# Patient Record
Sex: FEMALE | Race: WHITE
[De-identification: names, ages, dates, MRNs, and addresses within clinical notes are randomized per-mention and may not be internally consistent; named-entity substitution may affect disease eponyms.]

---

## 2019-12-08 ENCOUNTER — HOSPITAL ENCOUNTER (OUTPATIENT)
Dept: HOSPITAL 84 - D.LDO | Age: 29
Discharge: HOME | End: 2019-12-08
Attending: OBSTETRICS & GYNECOLOGY
Payer: MEDICAID

## 2019-12-08 VITALS — BODY MASS INDEX: 21.1 KG/M2 | BODY MASS INDEX: 27.5 KG/M2

## 2019-12-08 DIAGNOSIS — R10.84: ICD-10-CM

## 2019-12-08 DIAGNOSIS — Z3A.38: ICD-10-CM

## 2019-12-08 DIAGNOSIS — O26.893: Primary | ICD-10-CM

## 2019-12-10 ENCOUNTER — HOSPITAL ENCOUNTER (INPATIENT)
Dept: HOSPITAL 84 - D.LD | Age: 29
LOS: 3 days | Discharge: HOME | End: 2019-12-13
Attending: OBSTETRICS & GYNECOLOGY | Admitting: OBSTETRICS & GYNECOLOGY
Payer: MEDICAID

## 2019-12-10 VITALS — SYSTOLIC BLOOD PRESSURE: 138 MMHG | DIASTOLIC BLOOD PRESSURE: 68 MMHG

## 2019-12-10 VITALS — HEIGHT: 64 IN | WEIGHT: 160 LBS | BODY MASS INDEX: 27.31 KG/M2

## 2019-12-10 DIAGNOSIS — Z3A.38: ICD-10-CM

## 2019-12-11 VITALS — DIASTOLIC BLOOD PRESSURE: 65 MMHG | SYSTOLIC BLOOD PRESSURE: 120 MMHG

## 2019-12-11 LAB
APPEARANCE UR: CLEAR
BACTERIA #/AREA URNS HPF: (no result) /HPF
BILIRUB SERPL-MCNC: NEGATIVE MG/DL
CAOX CRY #/AREA URNS HPF: (no result) /HPF
COLOR UR: YELLOW
ERYTHROCYTE [DISTWIDTH] IN BLOOD BY AUTOMATED COUNT: 13.8 % (ref 11.5–14.5)
GLUCOSE SERPL-MCNC: NEGATIVE MG/DL
HCT VFR BLD CALC: 32 % (ref 36–48)
HGB BLD-MCNC: 10.6 G/DL (ref 12–16)
KETONES UR STRIP-MCNC: NEGATIVE MG/DL
MCH RBC QN AUTO: 31.3 PG (ref 26–34)
MCHC RBC AUTO-ENTMCNC: 33.1 G/DL (ref 31–37)
MCV RBC: 94.4 FL (ref 80–100)
MUCOUS THREADS #/AREA URNS LPF: (no result) /LPF
NITRITE UR-MCNC: NEGATIVE MG/ML
PH UR STRIP: 6 [PH] (ref 5–6)
PLATELET # BLD: 253 10X3/UL (ref 130–400)
PMV BLD AUTO: 10.7 FL (ref 7.4–10.4)
PROT UR-MCNC: (no result) MG/DL
RBC # BLD AUTO: 3.39 10X6/UL (ref 4–5.4)
RBC #/AREA URNS HPF: (no result) /HPF (ref 0–5)
SP GR UR STRIP: 1.02 (ref 1–1.02)
SQUAMOUS #/AREA URNS HPF: (no result) /HPF (ref 0–5)
UROBILINOGEN UR-MCNC: NORMAL MG/DL
WBC # BLD AUTO: 9.2 10X3/UL (ref 4.8–10.8)
WBC #/AREA URNS HPF: (no result) /HPF

## 2019-12-11 PROCEDURE — 0HQ9XZZ REPAIR PERINEUM SKIN, EXTERNAL APPROACH: ICD-10-PCS | Performed by: OBSTETRICS & GYNECOLOGY

## 2019-12-11 PROCEDURE — 3E0P7VZ INTRODUCTION OF HORMONE INTO FEMALE REPRODUCTIVE, VIA NATURAL OR ARTIFICIAL OPENING: ICD-10-PCS | Performed by: OBSTETRICS & GYNECOLOGY

## 2019-12-11 NOTE — NUR
PATIENT SITTING UP IN BED. ASSESSMENT AND VITAL SIGNS DONE. RESPIRATIONS AT
EASE. LUNG SOUNDS CLEAR IN ALL FIELDS. HEART REGULAR RATE AND RHYTHM. ABDOMEN
SOFT, NON TENDER. BOWEL SOUNDS PRESENT IN ALL QUADRANTS. FUNDUS FIRM, MIDLINE,
1 BELOW UMBILICUS. SMALL AMOUNT OF LOCHIA NOTED ON DARRON PAD. NO EDEMA NOTED TO
EXTREMITIES. PATIENT DENIES ANY PAIN AT THIS TIME. BED IN LOWEST POSITION,
SIDE RAILS UP X 2, C/L AND WATER WITHIN REACH.

## 2019-12-11 NOTE — NUR
PATIENT TRANSFERRED TO POST PARTUM ROOM 1273. PATIENT AMBULATED IN HALLWAY TO
ROOM WITH STEADY GAIT. BED IN LOWEST POSITION, SIDE RAILS UP X 2, C/L AND
WATER WITHIN REACH.

## 2019-12-11 NOTE — NUR
PATIENT LYING IN BED WITH EYES CLOSED. RESPIRATIONS AT EASE. NO SIGNS OF
DISTRESS NOTED. BED IN LOWEST POSITION, SIDE RAILS UP X 2, C/L AND WATER
WITHIN REACH.

## 2019-12-11 NOTE — NUR
PATIENT SITTING UP IN BED. PATIENT STATES SHE AMBULATED TO BATHROOM AND VOIDED
LARGE AMOUNT. DENIES ANY NEEDS AT THIS TIME. BED IN LOWEST POSITION, SIDE
RAILS UP X 2, C/L AND WATER WITHIN REACH.

## 2019-12-11 NOTE — NUR
PATIENT SITTING UP IN BED.  AT BEDSIDE. PATIENT DENIES ANY PAIN.
SCHEDULED TYLENOL 1000MG ADMINISTERED PO. PATIENT DENIES ANY NEEDS. BED IN
LOWEST POSITION, SIDE RAILS UP X 2, C/L AND WATER WITHIN REACH.

## 2019-12-12 VITALS — SYSTOLIC BLOOD PRESSURE: 110 MMHG | DIASTOLIC BLOOD PRESSURE: 65 MMHG

## 2019-12-12 VITALS — DIASTOLIC BLOOD PRESSURE: 73 MMHG | SYSTOLIC BLOOD PRESSURE: 122 MMHG

## 2019-12-12 VITALS — SYSTOLIC BLOOD PRESSURE: 115 MMHG | DIASTOLIC BLOOD PRESSURE: 70 MMHG

## 2019-12-12 LAB
BASOPHILS NFR BLD AUTO: 0.3 % (ref 0–2)
EOSINOPHIL NFR BLD: 1.4 % (ref 0–7)
ERYTHROCYTE [DISTWIDTH] IN BLOOD BY AUTOMATED COUNT: 13.8 % (ref 11.5–14.5)
HCT VFR BLD CALC: 29.3 % (ref 36–48)
HGB BLD-MCNC: 9.6 G/DL (ref 12–16)
IMM GRANULOCYTES NFR BLD: 0.3 % (ref 0–5)
LYMPHOCYTES NFR BLD AUTO: 22 % (ref 15–50)
MCH RBC QN AUTO: 30.9 PG (ref 26–34)
MCHC RBC AUTO-ENTMCNC: 32.8 G/DL (ref 31–37)
MCV RBC: 94.2 FL (ref 80–100)
MONOCYTES NFR BLD: 6.3 % (ref 2–11)
NEUTROPHILS NFR BLD AUTO: 69.7 % (ref 40–80)
PLATELET # BLD: 256 10X3/UL (ref 130–400)
PMV BLD AUTO: 10.3 FL (ref 7.4–10.4)
RBC # BLD AUTO: 3.11 10X6/UL (ref 4–5.4)
WBC # BLD AUTO: 9.6 10X3/UL (ref 4.8–10.8)

## 2019-12-12 NOTE — NUR
PATIENT SITTING UP IN BED HOLDING INFANT. DENIES ANY NEEDS AT THIS TIME.
DENIES PAIN. BED IN LOWEST POSITION, SIDE RAILS UP X 2, C/L AND WATER WITHIN
REACH.

## 2019-12-12 NOTE — NUR
PT AND SIG OTHER AMB IN HALLS WITH INFANT IN CRIB, INFANT TAKEN TO NBN AND PT
WALKS OFF UNIT WITH FAMILY MEMBERS.

## 2019-12-12 NOTE — NUR
PT RESTING IN BED HOLDING INFANT. ASSESSMNET COMPLETE PER FLOWSHEET. PT DENIES
ANY COMPLAINTS OF PAIN. BBS CLEAR. ABDOMEN SOFT. FUNDUS FIRM AND 2 BELOW
UMBILICUS. SMALL AMOUNT OF LOCHIA NOTED ON PERIPAD. PT REPORTS THAT SHE IS
PASSING GAS AND HAD A BM TODAY. PT DENIES ANY COMPLAINTS WITH VOIDING. NO
EDEMA NOTED TO BLE. POC DISCUSSED. QUESTIONS ANSWERED. ORANGE JUICE PROVIDED.
PT INSTRUCTED TO NOTIFY NURSE WITH ANY PROBLEMS, NEEDS, OR CONCERNS,
VERBALIZED UNDERSTANDING. BED IN LOW POSITION. SR UP X2. CALL LIGHT WITHIN PTS
REACH.

## 2019-12-12 NOTE — NUR
RESTING QUIETLY.  BABY IN ROOM SLEEPING.  PT MOTHER IS ALSO STAYING THE NIGHT.
NO C/O PAIN AND HAS NO NEEDS AT THIS TIME.  SHE WAS ASKED TO CALL IF SHE
NEEDED ANYTHING.

## 2019-12-12 NOTE — NUR
PT IS CHANGING HER BABY.  BABY WIPES BROUGHT TO HER. PT HAS NO PAIN NOW AND
HAS NO NEEDS.  FOB IS IN THE ROOM. PT WAS INSTRUCTED TO CALL IF SHE NEEDS
ANYTHING.

## 2019-12-12 NOTE — NUR
PATIENT LYING IN BED WITH EYES CLOSED. EASILY AROUSED. DENIES PAIN. VITAL
SIGNS DONE. PATIENT DENIES ANY NEEDS AT THIS TIME. INFANT AT BEDSIDE LYING IN
OPEN CRIB. BED IN LOWEST POSITION, SIDE RAILS UP X 2, C/L AND WATER WITHIN
REACH.

## 2019-12-12 NOTE — NUR
LARGE ICE WATER PER REQUEST.  NO OTHER NEEDS VOICED AT THIS TIME. INFANT IN
CRIB AT BEDSIDE. SIDE RAILS UP X 2 WITH CALL LIGHT IN REACH.

## 2019-12-12 NOTE — NUR
AM ASSESSMENT COMPLETED.  PT RATES HER PAIN AT 0/10,  DENIES CLOTS WITH VOIDS.
FUNDUS FIRM AT U/1.  SALINE LOCK REMOVED FROM RIGHT FOREARM PER PT REQUEST, IV
CATH NOTED TO BE INTACT. TOWELS PLACED IN BATHROOM FOR PT TO SHOWER WHEN SHE
IS READY.  DENIES OTHER NEEDS AT THIS TIME, INFANT IN CRIB AT BEDSIDE. SIDE
RAILS UP X 2 WITH CALL LIGHT IN REACH.

## 2019-12-12 NOTE — NUR
PATIENT LYING QUIETLY IN BED WITH EYES CLOSED. EASILY AROUSED. DENIES PAIN.
SCHEDULED TYLENOL 1000 MG ADMINISTERED PO. PATIENT DENIES FURTHER NEEDS. BED
IN LOWEST POSITION, SIDE RAILS UP X 2, C/L AND WATER WITHIN REACH.

## 2019-12-12 NOTE — NUR
PATIENT LYING QUIETLY IN BED WITH EYES CLOSED. RESPIRATIONS AT EASE. NO SIGNS
OF DISTRESS NOTED. BED IN LOWEST POSITION, SIDE RAILS UP X2, C/L AND WATER
WITHIN REACH.

## 2019-12-12 NOTE — NUR
PT HAS NO C/O AT THIS TIME. BABY IN ROOM AS WELL AS SIGNIFICANT OTHER. PT
ASKED TO CALL IF SHE HAD PAIN OR ANY CONCERNS.

## 2019-12-12 NOTE — NUR
PT RESTING IN BED HOLDING INFANT. VSS. PT DENIES ANY COMPLAINTS OF PAIN. NO
REQUEST MADE. INSTRUCTED PT TO NOTIFY NURSE WITH ANY PROBLEMS, NEEDS, OR
CONCERNS. VERBALIZED UNDERSTANDING. BED IN LOW POSITION. SR UP X2. CALL LIGHT
WITHON PTS REACH.

## 2019-12-12 NOTE — NUR
PT GETTING UP TO BR, REQUESTED AND SERVED ORANGE JUICE, DENIES FURTHER NEEDS
OR PAIN, INFANT IN OPEN CRIB CART AND FAMILY MEMBER IN ROOM

## 2019-12-12 NOTE — NUR
DR WHITMORE MAKES HIS ROUNDS. AFTER TALKING WITH PT SHE WISHES TO REMAIN AN
INPATIENT UNTIL INFANT IS DISCHARGED.  UNDERSTANDS THAT SHE WILL BE
TRANSFERRED TO [facility].

## 2019-12-13 VITALS — SYSTOLIC BLOOD PRESSURE: 99 MMHG | DIASTOLIC BLOOD PRESSURE: 69 MMHG

## 2019-12-13 NOTE — NUR
ASSESSMENT DONE. AWAKE AND ALERT. VERBAL RESPONSES APPRO TO QUESTIONS. UP AND
ABOUT IN ROOM AT [facility]. STATES HAS A LITTLE CRAMPING WHEN ASK- DENIES NEEDING
MEDICATION. FUNDUS U1/FIRM. SMALL LOCHIA NOTED ON PAD. INFANT IN ROOM.

## 2019-12-13 NOTE — NUR
DISCHARGE INST VERBAL AND WRITTEN GIVEN. SCRIPT X 1 GIVEN -TYLENOL#3 ALONG
WITH PT MED REC AND DRUG DATA INFO. PFW POST PARTUM INST GIVEN. AWHOON INST
GIVEN. ALSO SEE SIGNED INST SHEET. PT HEALTH SUMMARY GIVEN. PT DENIES
QUESTIONS. INFANT IS NOT DISCHARGED YET. INSTRUCTED TO LET THIS NURSE KNOW
WHEN INFANT IS DISCHARGED.

## 2019-12-13 NOTE — NUR
PT HAS BEEN UP WITH BABY FOR A LITTLE WHILE.  SHE HAS NO C/O OF PAIN OR
DISCOMFORT.  BOTTLES AND NIPPLES TAKEN TO PT.

## 2019-12-13 NOTE — NUR
MOTRIN 1000 MG GIVEN PO PER  ORDER.  NO C/O FROM PT. PT ASKED IF HER BABY
COULD GO TO THE NURSERY FOR A BIT.  BABY WAS TAKEN TO NURSERY BY HAILEY BARRETT.

## 2024-10-11 NOTE — NUR
FUTURE VISIT INFORMATION      FUTURE VISIT INFORMATION:  Date: 12/19/24  Time: 8am  Location: [facility] [place]  REFERRAL INFORMATION:  Referring provider:  Jonathan Guan MD   Referring providers clinic:  [facility]   Reason for visit/diagnosis  per patient sinus issues     RECORDS REQUESTED FROM:       Clinic name Comments Records Status Imaging Status   [facility] [place] [facility]  9/19/24- Ov wJonathan Mckenna MD  Epic     Imaging  9/30/24- CT Sinus  [facility]  PACS            PT TRANSFERRED VIA AMB, GAIT STEADY, TO [facility] ROOM 1221 WITH ALL BELONGINGS,
INFANT BACK TO [facility] AT THIS TIME, PT ORIENTED TO ROOM, BED IN LOW POSITION,
SIDE RAILS X 2, CALL LIGHT IN REACH, FAMILY AT BEDSIDE